# Patient Record
Sex: MALE | Race: BLACK OR AFRICAN AMERICAN
[De-identification: names, ages, dates, MRNs, and addresses within clinical notes are randomized per-mention and may not be internally consistent; named-entity substitution may affect disease eponyms.]

---

## 2020-06-03 ENCOUNTER — HOSPITAL ENCOUNTER (EMERGENCY)
Dept: HOSPITAL 62 - ER | Age: 50
Discharge: HOME | End: 2020-06-03
Payer: SELF-PAY

## 2020-06-03 VITALS — DIASTOLIC BLOOD PRESSURE: 102 MMHG | SYSTOLIC BLOOD PRESSURE: 184 MMHG

## 2020-06-03 DIAGNOSIS — I10: ICD-10-CM

## 2020-06-03 DIAGNOSIS — R55: Primary | ICD-10-CM

## 2020-06-03 DIAGNOSIS — F17.200: ICD-10-CM

## 2020-06-03 DIAGNOSIS — R11.0: ICD-10-CM

## 2020-06-03 LAB
ADD MANUAL DIFF: NO
ALBUMIN SERPL-MCNC: 4.6 G/DL (ref 3.5–5)
ALP SERPL-CCNC: 100 U/L (ref 38–126)
ANION GAP SERPL CALC-SCNC: 11 MMOL/L (ref 5–19)
APAP SERPL-MCNC: < 10 UG/ML (ref 10–30)
AST SERPL-CCNC: 33 U/L (ref 17–59)
BASOPHILS # BLD AUTO: 0.1 10^3/UL (ref 0–0.2)
BASOPHILS NFR BLD AUTO: 0.7 % (ref 0–2)
BILIRUB DIRECT SERPL-MCNC: 0 MG/DL (ref 0–0.4)
BILIRUB SERPL-MCNC: 0.5 MG/DL (ref 0.2–1.3)
BUN SERPL-MCNC: 18 MG/DL (ref 7–20)
CALCIUM: 9.5 MG/DL (ref 8.4–10.2)
CHLORIDE SERPL-SCNC: 99 MMOL/L (ref 98–107)
CO2 SERPL-SCNC: 28 MMOL/L (ref 22–30)
EOSINOPHIL # BLD AUTO: 0.2 10^3/UL (ref 0–0.6)
EOSINOPHIL NFR BLD AUTO: 1.2 % (ref 0–6)
ERYTHROCYTE [DISTWIDTH] IN BLOOD BY AUTOMATED COUNT: 14.9 % (ref 11.5–14)
ETHANOL SERPL-MCNC: < 10 MG/DL
GLUCOSE SERPL-MCNC: 138 MG/DL (ref 75–110)
HCT VFR BLD CALC: 43.2 % (ref 37.9–51)
HGB BLD-MCNC: 14.2 G/DL (ref 13.5–17)
LYMPHOCYTES # BLD AUTO: 3.9 10^3/UL (ref 0.5–4.7)
LYMPHOCYTES NFR BLD AUTO: 31.5 % (ref 13–45)
MCH RBC QN AUTO: 28.5 PG (ref 27–33.4)
MCHC RBC AUTO-ENTMCNC: 32.9 G/DL (ref 32–36)
MCV RBC AUTO: 87 FL (ref 80–97)
MONOCYTES # BLD AUTO: 1.1 10^3/UL (ref 0.1–1.4)
MONOCYTES NFR BLD AUTO: 8.7 % (ref 3–13)
NEUTROPHILS # BLD AUTO: 7.2 10^3/UL (ref 1.7–8.2)
NEUTS SEG NFR BLD AUTO: 57.9 % (ref 42–78)
PLATELET # BLD: 229 10^3/UL (ref 150–450)
POTASSIUM SERPL-SCNC: 3.4 MMOL/L (ref 3.6–5)
PROT SERPL-MCNC: 8.2 G/DL (ref 6.3–8.2)
RBC # BLD AUTO: 4.98 10^6/UL (ref 4.35–5.55)
SALICYLATES SERPL-MCNC: 1.2 MG/DL (ref 2–20)
TOTAL CELLS COUNTED % (AUTO): 100 %
WBC # BLD AUTO: 12.4 10^3/UL (ref 4–10.5)

## 2020-06-03 PROCEDURE — 80307 DRUG TEST PRSMV CHEM ANLYZR: CPT

## 2020-06-03 PROCEDURE — 70450 CT HEAD/BRAIN W/O DYE: CPT

## 2020-06-03 PROCEDURE — 72125 CT NECK SPINE W/O DYE: CPT

## 2020-06-03 PROCEDURE — 71045 X-RAY EXAM CHEST 1 VIEW: CPT

## 2020-06-03 PROCEDURE — 93005 ELECTROCARDIOGRAM TRACING: CPT

## 2020-06-03 PROCEDURE — 85025 COMPLETE CBC W/AUTO DIFF WBC: CPT

## 2020-06-03 PROCEDURE — 96374 THER/PROPH/DIAG INJ IV PUSH: CPT

## 2020-06-03 PROCEDURE — 93010 ELECTROCARDIOGRAM REPORT: CPT

## 2020-06-03 PROCEDURE — 80053 COMPREHEN METABOLIC PANEL: CPT

## 2020-06-03 PROCEDURE — 99285 EMERGENCY DEPT VISIT HI MDM: CPT

## 2020-06-03 PROCEDURE — 36415 COLL VENOUS BLD VENIPUNCTURE: CPT

## 2020-06-03 NOTE — EKG REPORT
SEVERITY:- ABNORMAL ECG -

SINUS TACHYCARDIA

RAA, CONSIDER BIATRIAL ABNORMALITIES

:

Confirmed by: Kervin Quan 03-Jun-2020 23:38:10

## 2020-06-03 NOTE — ER DOCUMENT REPORT
ED General





- General


Chief Complaint: Overdose


Stated Complaint: POSSIBLE OVERDOSE


Time Seen by Provider: 06/03/20 21:01


Primary Care Provider: 


ANTHONY DAVALOS MD [ACTIVE STAFF] - Follow up in 1 week


Notes: 


Patient is a 50-year-old male that comes emergency department for chief 

complaint of a possible overdose.  Patient states he is having difficulty 

remembering what happened to, he states he remembers being at the house tonight,

remembers being very stressed out because he was being evicted, he states that 

he thinks that he accidentally struck his head on a shelf in his home and 

knocked himself out.  He states that he had been drinking some alcohol tonight 

as well.  He states that he woke up on the ground and there were people standing

over him, he states he was told that he had been given Narcan.  He denies using 

any recreational drugs except for marijuana.  He states he smokes.  He states he

supposed be on blood pressure medication but does not take it.  EMS reported 

that he was given 4 mg intranasal Narcan and 0.4 mg of IV Narcan, patient became

very aroused after this.  Patient states he feels slightly lightheaded and 

slightly nauseated but he denies headache, chest pain, difficulty breathing, 

abdominal pain, back pain, focal numbness or weakness, or any other complaints.





Past Medical History





- General


Information source: Patient





- Social History


Smoking Status: Current Every Day Smoker


Frequency of alcohol use: Social


Drug Abuse: Marijuana


Lives with: Family


Family History: Reviewed & Not Pertinent


Patient has homicidal ideation: No





- Past Medical History


Cardiac Medical History: Reports: Hx Hypertension





- Immunizations


Hx Diphtheria, Pertussis, Tetanus Vaccination: Yes





Review of Systems





- Review of Systems


Constitutional: No symptoms reported


EENT: No symptoms reported


Cardiovascular: See HPI


Respiratory: No symptoms reported


Gastrointestinal: No symptoms reported


Genitourinary: No symptoms reported


Male Genitourinary: No symptoms reported


Musculoskeletal: No symptoms reported


Skin: No symptoms reported


Hematologic/Lymphatic: No symptoms reported


Neurological/Psychological: See HPI





Physical Exam





- Vital signs


Vitals: 


                                        











Temp Pulse Resp BP Pulse Ox


 


 97.7 F   99   14   190/118 H  94 


 


 06/03/20 20:49  06/03/20 20:49  06/03/20 20:49  06/03/20 20:49  06/03/20 20:49














- Notes


Notes: 





GENERAL: Patient appears anxious but he is alert, cooperative, and interactive 

and does not appear to be in distress


HEAD: Normocephalic, no signs of trauma noted


EYES: Pupils equal, round, and reactive to light. Extraocular movements intact.


ENT: Oral mucosa moist, tongue midline. Oropharynx unremarkable. Airway patent. 

Nares patent, sinuses non-tender, ear canals unremarkable, TM's intact.


NECK: Full range of motion. Supple. Trachea midline. No lymphadenopathy.


LUNGS: Clear to auscultation bilaterally, no wheezes, rales, or rhonchi. No 

respiratory distress. Non-tender chest wall with no signs of trauma. 


HEART: Regular rate and rhythm. No murmur


ABDOMEN: Soft, non-tender. Non-distended.  No signs of trauma.


EXTREMITIES: Moves all 4 extremities spontaneously. No edema, normal radial and 

dorsalis pedis pulses bilaterally. No cyanosis.


BACK: No signs of trauma.  No cervical, thoracic, lumbar midline tenderness. No 

saddle anesthesia, normal distal neurovascular exam. Moves all extremities in 

full range of motion.


NEUROLOGICAL: Alert and oriented x3. Normal speech. Cranial nerves II through 

XII grossly intact. Strength 5/5 in all extremities. 


PSYCH: Slightly anxious and talks rapidly but otherwise unremarkable


SKIN: Warm, dry, normal turgor. No rashes or lesions noted.





Course





- Re-evaluation


Re-evalutation: 


On my initial exam patient is anxious but alert and cooperative.  He states he 

feels slightly lightheaded and slightly nauseated.  He insists that he believes 

he struck his head and passed out, he denies using heroin or any opiates.  He 

states he used to in the past but he has not anytime recently and he only uses 

marijuana.  He does not have any track marks noted.  He does not have pinpoint 

pupils although he did receive Narcan.  Patient will be monitored for at least 2

hours.  In addition to this work-up will be completed for syncope.





06/03/20


CT of the head is unremarkable, CT of the neck with no acute findings, patient 

was provided with a copy of his report for follow-up because of possible 

abnormal finding versus hemangioma.  Chest x-ray unremarkable, EKG without acute

finding, CBC, chemistry nonspecific, alcohol is negative.





I discussed with patient.  Patient did urinate without providing a sample when 

staff was not available seemingly.  He states that his initial lightheadedness 

is gone, I had patient ambulate and he did this without any difficulty, patient 

has been monitored for 2 hours and is requesting to leave.  I did discuss the 

possibility of patient overdosing because of his history of drug abuse and 

receiving Narcan at night, I discussed the extreme dangers of illegal drugs.  

Patient does state understanding.  Patient still states that he struck his head 

and passed out, I discussed head injury cautions as a result in detail.  Also 

discussed patient's blood pressure.  He does not currently have a headache, he 

does not have chest pain, but he is hypertensive.  This is chronic and 

untreated.  Patient referred to primary care after discussion, started on blood 

pressure medication for this.  Patient states appreciation.  Patient is 

requesting to leave with his right who is here now despite him not having a 

urine sample, he was discharged on request, asymptomatic and well-appearing at 

time of discharge.





- Vital Signs


Vital signs: 


                                        











Temp Pulse Resp BP Pulse Ox


 


 97.7 F   99   14   190/118 H  94 


 


 06/03/20 20:49  06/03/20 20:49  06/03/20 20:49  06/03/20 20:49  06/03/20 20:49














- Laboratory


Result Diagrams: 


                                 06/03/20 20:54





                                 06/03/20 20:54


Laboratory results interpreted by me: 


                                        











  06/03/20 06/03/20





  20:54 20:54


 


WBC  12.4 H 


 


RDW  14.9 H 


 


Potassium   3.4 L


 


Glucose   138 H


 


Salicylates   1.2 L


 


Acetaminophen   < 10 L














- EKG Interpretation by Me


Additional EKG results interpreted by me: 


EKG shows sinus tachycardia at a rate of 105, QTC of 471, normal axis, IA 

interval of 152.  No T wave inversions or ST segment changes in consecutive mary

ds.





Discharge





- Discharge


Clinical Impression: 


 Essential hypertension





Episode of syncope


Qualifiers:


 Syncope type: unspecified Qualified Code(s): R55 - Syncope and collapse





Condition: Stable


Disposition: HOME, SELF-CARE


Additional Instructions: 


Your work-up including a CAT scan of the head does not show any concerning 

findings at this time.


Because of suspected head injury with your episode tonight I recommend the head 

injury precautions which are listed below.


Your blood pressure is too elevated, you have been started on blood pressure 

medication, you will need to have this rechecked for additional management, 

please follow-up with the referral listed and begin the blood pressure 

medication.  Your CAT scan of the neck also shows one small area that needs to 

be followed and evaluated by primary care, please take your report with you.





Avoid any recreational or illegal substances.  These are extremely dangerous and

will lead to your death with use.  Return for any concerning symptoms including 

severe headache, vomiting, passing out again, chest pain, or if something is not

right.


______________________





Head Injury Precautions





    At this point, there is no evidence that your head injury is serious.  

Observation is necessary, however.


     


      Limit activity for the first 24 hours.   During the first 24 hours, check 

to see approximately every two to three hours that the patient is easily 

arousable, responds normally, and can perform common tasks such as walking 

without difficulty.


      Contact your doctor or go to the hospital if any of the following things 

occur: Persistent vomiting, difficulty in arousing the patient, worsening or 

continued headache, or failure to improve as expected.  Head injuries can cause 

symptoms that persist for a few days or even a few weeks.


Prescriptions: 


Hydrochlorothiazide [Hydrodiuril 25 mg Tablet] 25 mg PO QAM #30 tablet


Referrals: 


ANTHONY DAVALOS MD [ACTIVE STAFF] - Follow up in 1 week

## 2020-06-03 NOTE — RADIOLOGY REPORT (SQ)
INDICATION: head injury, syncopal episode, ETOH.



COMPARISON:  None



CORRELATION:  None



TECHNIQUE: Noncontrast  spiral axial CT images were obtained from

the skull base to vertex.  This exam was performed according to

our departmental dose-optimization program, which includes

automated exposure control, adjustment of the mA and/or kV

according to patient size and/or use of iterative reconstruction

techniques.



FINDINGS: There is no evidence of acute intracranial hemorrhage,

midline shift, mass effect or mass lesion.  Gray-white

differentiation is normal.  There is no evidence of acute large

territory infarct.



Ventricles and extracerebral spaces are within normal limits, for

age.



The visualized paranasal sinuses are grossly clear. The orbits

and eyeballs are unremarkable.  The mastoid air cells are clear. 

Skull base and calvarium appear intact.





IMPRESSION:

No acute intracranial process is identified.

## 2020-06-03 NOTE — RADIOLOGY REPORT (SQ)
EXAM DESCRIPTION:

X-ray, single AP portable view



CLINICAL HISTORY:

50 years Male, overdose; aspiration?



COMPARISON: None.



FINDINGS:

Lungs: Lungs are clear.  No pneumonia or edema.  No pneumothorax

or pleural effusion. 

Mediastinum: Cardiac and mediastinal silhouette are normal.

Bones: Osseous structures are normal.



IMPRESSION:

Unremarkable single view of the chest. No acute process.

## 2020-06-03 NOTE — RADIOLOGY REPORT (SQ)
EXAM DESCRIPTION: 



CT CERVICAL SPINE WITHOUT IV CONTRAST



COMPLETED DATE/TME:  06/03/2020 21:14



CLINICAL HISTORY: 



50 years, Male, head injury, ETOH



COMPARISON:

None.



TECHNIQUE:

Noncontrast CT of the cervical spine was acquired. Coronal and

sagittal reformations were created.  Images stored on PACS.

 

All CT scanners at this facility use dose modulation, iterative

reconstruction, and/or weight based dosing when appropriate to

reduce radiation dose to as low as reasonably achievable (ALARA).





[facility]: Dose Right [facility]: CareDose   [facility]: Dose Right    [facility]:

Teradose 4D    [facility]: Smart [employer]



LIMITATIONS:

None.



FINDINGS:



Limited evaluation of the posterior fossa structures reveals no

suspicious finding.



Occipital condyles are normal. Lateral masses of C1 and C2 align

properly. Base and tip of the dens are intact. Craniocervical

alignment is maintained.



Cervical vertebral body heights and alignments are maintained. No

acute fracture or malalignment is appreciated. Mild-to-moderate

multilevel cervical spondylosis is noted, designated by

intervertebral space narrowing, hypertrophic endplate spurring,

and facet hypertrophy. This is most pronounced spanning C5-C7.

Multilevel uncovertebral joint hypertrophy is also evident,

ultimately contributing to mild right neural foraminal stenosis

at C5-C6.



Limited evaluation of the lung apices reveals no suspicious

finding.



Paravertebral soft tissues show no suspicious abnormality.

However, there are calcifications about the bilateral carotid

vasculature.



In addition, there is a geographic appearing lucent lesion

located within the C4 vertebral body. This may demonstrate some

degree of trabeculation, best visualized on the axial and coronal

reconstructions.



Likewise, there is reversal of the normal cervical lordosis,

either related to positioning and/or muscle spasm.



IMPRESSION:



No acute fracture or malalignment.



Mild-to-moderate multilevel cervical spondylosis.



Small lucent lesion located within the C4 vertebral body. This

appears to correspond to a benign hemangioma given its

trabeculated configuration. However, confirmation with

nonemergent MR should be considered.



TECHNICAL DOCUMENTATION:



Quality ID # 436: Final reports with documentation of one or more

dose reduction techniques (e.g., Automated exposure control,

adjustment of the mA and/or kV according to patient size, use of

iterative reconstruction technique)



copyright 2011 [employer]- All Rights Reserved

## 2020-08-19 ENCOUNTER — HOSPITAL ENCOUNTER (EMERGENCY)
Dept: HOSPITAL 62 - ER | Age: 50
Discharge: HOME | End: 2020-08-19
Payer: COMMERCIAL

## 2020-08-19 VITALS — DIASTOLIC BLOOD PRESSURE: 109 MMHG | SYSTOLIC BLOOD PRESSURE: 219 MMHG

## 2020-08-19 DIAGNOSIS — M25.561: ICD-10-CM

## 2020-08-19 DIAGNOSIS — R10.9: ICD-10-CM

## 2020-08-19 DIAGNOSIS — S39.012A: ICD-10-CM

## 2020-08-19 DIAGNOSIS — I10: ICD-10-CM

## 2020-08-19 DIAGNOSIS — F17.200: ICD-10-CM
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
## 2020-08-19 DIAGNOSIS — R10.84: ICD-10-CM

## 2020-08-19 DIAGNOSIS — S16.1XXA: Primary | ICD-10-CM

## 2020-08-19 DIAGNOSIS — M54.2: ICD-10-CM

## 2020-08-19 DIAGNOSIS — S30.0XXA: ICD-10-CM

## 2020-08-19 LAB
ADD MANUAL DIFF: NO
ALBUMIN SERPL-MCNC: 3.4 G/DL (ref 3.5–5)
ALP SERPL-CCNC: 83 U/L (ref 38–126)
ANION GAP SERPL CALC-SCNC: 5 MMOL/L (ref 5–19)
APPEARANCE UR: (no result)
APTT PPP: YELLOW S
AST SERPL-CCNC: 25 U/L (ref 17–59)
BASOPHILS # BLD AUTO: 0 10^3/UL (ref 0–0.2)
BASOPHILS NFR BLD AUTO: 0.5 % (ref 0–2)
BILIRUB DIRECT SERPL-MCNC: 0 MG/DL (ref 0–0.4)
BILIRUB SERPL-MCNC: 0.2 MG/DL (ref 0.2–1.3)
BILIRUB UR QL STRIP: NEGATIVE
BUN SERPL-MCNC: 9 MG/DL (ref 7–20)
CALCIUM: 8.2 MG/DL (ref 8.4–10.2)
CHLORIDE SERPL-SCNC: 108 MMOL/L (ref 98–107)
CO2 SERPL-SCNC: 27 MMOL/L (ref 22–30)
EOSINOPHIL # BLD AUTO: 0 10^3/UL (ref 0–0.6)
EOSINOPHIL NFR BLD AUTO: 0.4 % (ref 0–6)
ERYTHROCYTE [DISTWIDTH] IN BLOOD BY AUTOMATED COUNT: 14.3 % (ref 11.5–14)
GLUCOSE SERPL-MCNC: 92 MG/DL (ref 75–110)
GLUCOSE UR STRIP-MCNC: NEGATIVE MG/DL
HCT VFR BLD CALC: 38.3 % (ref 37.9–51)
HGB BLD-MCNC: 12.9 G/DL (ref 13.5–17)
KETONES UR STRIP-MCNC: NEGATIVE MG/DL
LYMPHOCYTES # BLD AUTO: 2.5 10^3/UL (ref 0.5–4.7)
LYMPHOCYTES NFR BLD AUTO: 28.3 % (ref 13–45)
MCH RBC QN AUTO: 29 PG (ref 27–33.4)
MCHC RBC AUTO-ENTMCNC: 33.7 G/DL (ref 32–36)
MCV RBC AUTO: 86 FL (ref 80–97)
MONOCYTES # BLD AUTO: 0.7 10^3/UL (ref 0.1–1.4)
MONOCYTES NFR BLD AUTO: 7.6 % (ref 3–13)
NEUTROPHILS # BLD AUTO: 5.5 10^3/UL (ref 1.7–8.2)
NEUTS SEG NFR BLD AUTO: 63.2 % (ref 42–78)
PH UR STRIP: 7 [PH] (ref 5–9)
PLATELET # BLD: 239 10^3/UL (ref 150–450)
POTASSIUM SERPL-SCNC: 3.4 MMOL/L (ref 3.6–5)
PROT SERPL-MCNC: 6.4 G/DL (ref 6.3–8.2)
PROT UR STRIP-MCNC: NEGATIVE MG/DL
RBC # BLD AUTO: 4.45 10^6/UL (ref 4.35–5.55)
SP GR UR STRIP: 1.03
TOTAL CELLS COUNTED % (AUTO): 100 %
UROBILINOGEN UR-MCNC: NEGATIVE MG/DL (ref ?–2)
WBC # BLD AUTO: 8.8 10^3/UL (ref 4–10.5)

## 2020-08-19 PROCEDURE — 71045 X-RAY EXAM CHEST 1 VIEW: CPT

## 2020-08-19 PROCEDURE — 99285 EMERGENCY DEPT VISIT HI MDM: CPT

## 2020-08-19 PROCEDURE — 96375 TX/PRO/DX INJ NEW DRUG ADDON: CPT

## 2020-08-19 PROCEDURE — 96361 HYDRATE IV INFUSION ADD-ON: CPT

## 2020-08-19 PROCEDURE — 85025 COMPLETE CBC W/AUTO DIFF WBC: CPT

## 2020-08-19 PROCEDURE — 72100 X-RAY EXAM L-S SPINE 2/3 VWS: CPT

## 2020-08-19 PROCEDURE — 73564 X-RAY EXAM KNEE 4 OR MORE: CPT

## 2020-08-19 PROCEDURE — 72170 X-RAY EXAM OF PELVIS: CPT

## 2020-08-19 PROCEDURE — 90715 TDAP VACCINE 7 YRS/> IM: CPT

## 2020-08-19 PROCEDURE — 90471 IMMUNIZATION ADMIN: CPT

## 2020-08-19 PROCEDURE — 96374 THER/PROPH/DIAG INJ IV PUSH: CPT

## 2020-08-19 PROCEDURE — 81001 URINALYSIS AUTO W/SCOPE: CPT

## 2020-08-19 PROCEDURE — 80053 COMPREHEN METABOLIC PANEL: CPT

## 2020-08-19 PROCEDURE — 72125 CT NECK SPINE W/O DYE: CPT

## 2020-08-19 PROCEDURE — 74177 CT ABD & PELVIS W/CONTRAST: CPT

## 2020-08-19 PROCEDURE — 36415 COLL VENOUS BLD VENIPUNCTURE: CPT
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
